# Patient Record
Sex: FEMALE | Race: WHITE | HISPANIC OR LATINO | Employment: UNEMPLOYED | URBAN - METROPOLITAN AREA
[De-identification: names, ages, dates, MRNs, and addresses within clinical notes are randomized per-mention and may not be internally consistent; named-entity substitution may affect disease eponyms.]

---

## 2023-10-20 ENCOUNTER — HOSPITAL ENCOUNTER (EMERGENCY)
Facility: HOSPITAL | Age: 33
Discharge: HOME | End: 2023-10-20
Attending: STUDENT IN AN ORGANIZED HEALTH CARE EDUCATION/TRAINING PROGRAM

## 2023-10-20 ENCOUNTER — APPOINTMENT (OUTPATIENT)
Dept: RADIOLOGY | Facility: HOSPITAL | Age: 33
End: 2023-10-20

## 2023-10-20 VITALS
BODY MASS INDEX: 27.66 KG/M2 | DIASTOLIC BLOOD PRESSURE: 71 MMHG | OXYGEN SATURATION: 99 % | HEART RATE: 85 BPM | SYSTOLIC BLOOD PRESSURE: 127 MMHG | RESPIRATION RATE: 16 BRPM | WEIGHT: 156.09 LBS | TEMPERATURE: 98.4 F | HEIGHT: 63 IN

## 2023-10-20 DIAGNOSIS — R10.9 LEFT FLANK PAIN: Primary | ICD-10-CM

## 2023-10-20 PROBLEM — G43.909 MIGRAINE HEADACHE: Status: ACTIVE | Noted: 2022-06-01

## 2023-10-20 LAB
ALBUMIN SERPL-MCNC: 4.2 G/DL (ref 3.5–5)
ALP BLD-CCNC: 110 U/L (ref 35–125)
ALT SERPL-CCNC: 25 U/L (ref 5–40)
ANION GAP SERPL CALC-SCNC: 12 MMOL/L
APPEARANCE UR: ABNORMAL
AST SERPL-CCNC: 39 U/L (ref 5–40)
BASOPHILS # BLD AUTO: 0.02 X10*3/UL (ref 0–0.1)
BASOPHILS NFR BLD AUTO: 0.5 %
BILIRUB SERPL-MCNC: 0.2 MG/DL (ref 0.1–1.2)
BILIRUB UR STRIP.AUTO-MCNC: NEGATIVE MG/DL
BUN SERPL-MCNC: 9 MG/DL (ref 8–25)
CALCIUM SERPL-MCNC: 8.5 MG/DL (ref 8.5–10.4)
CHLORIDE SERPL-SCNC: 103 MMOL/L (ref 97–107)
CO2 SERPL-SCNC: 23 MMOL/L (ref 24–31)
COLOR UR: ABNORMAL
CREAT SERPL-MCNC: 0.5 MG/DL (ref 0.4–1.6)
EOSINOPHIL # BLD AUTO: 0.1 X10*3/UL (ref 0–0.7)
EOSINOPHIL NFR BLD AUTO: 2.4 %
ERYTHROCYTE [DISTWIDTH] IN BLOOD BY AUTOMATED COUNT: 18.1 % (ref 11.5–14.5)
GFR SERPL CREATININE-BSD FRML MDRD: >90 ML/MIN/1.73M*2
GLUCOSE SERPL-MCNC: 106 MG/DL (ref 65–99)
GLUCOSE UR STRIP.AUTO-MCNC: NORMAL MG/DL
HCG UR QL IA.RAPID: NEGATIVE
HCT VFR BLD AUTO: 34.8 % (ref 36–46)
HGB BLD-MCNC: 10.6 G/DL (ref 12–16)
IMM GRANULOCYTES # BLD AUTO: 0 X10*3/UL (ref 0–0.7)
IMM GRANULOCYTES NFR BLD AUTO: 0 % (ref 0–0.9)
KETONES UR STRIP.AUTO-MCNC: NEGATIVE MG/DL
LEUKOCYTE ESTERASE UR QL STRIP.AUTO: NEGATIVE
LIPASE SERPL-CCNC: 20 U/L (ref 16–63)
LYMPHOCYTES # BLD AUTO: 2.05 X10*3/UL (ref 1.2–4.8)
LYMPHOCYTES NFR BLD AUTO: 49.5 %
MCH RBC QN AUTO: 23.2 PG (ref 26–34)
MCHC RBC AUTO-ENTMCNC: 30.5 G/DL (ref 32–36)
MCV RBC AUTO: 76 FL (ref 80–100)
MONOCYTES # BLD AUTO: 0.38 X10*3/UL (ref 0.1–1)
MONOCYTES NFR BLD AUTO: 9.2 %
NEUTROPHILS # BLD AUTO: 1.59 X10*3/UL (ref 1.2–7.7)
NEUTROPHILS NFR BLD AUTO: 38.4 %
NITRITE UR QL STRIP.AUTO: NEGATIVE
NRBC BLD-RTO: 0 /100 WBCS (ref 0–0)
PH UR STRIP.AUTO: 5.5 [PH]
PLATELET # BLD AUTO: 326 X10*3/UL (ref 150–450)
PMV BLD AUTO: 10.7 FL (ref 7.5–11.5)
POTASSIUM SERPL-SCNC: 3.8 MMOL/L (ref 3.4–5.1)
PROT SERPL-MCNC: 7.7 G/DL (ref 5.9–7.9)
PROT UR STRIP.AUTO-MCNC: NEGATIVE MG/DL
RBC # BLD AUTO: 4.56 X10*6/UL (ref 4–5.2)
RBC # UR STRIP.AUTO: NEGATIVE /UL
SODIUM SERPL-SCNC: 138 MMOL/L (ref 133–145)
SP GR UR STRIP.AUTO: 1.02
UROBILINOGEN UR STRIP.AUTO-MCNC: NORMAL MG/DL
WBC # BLD AUTO: 4.1 X10*3/UL (ref 4.4–11.3)

## 2023-10-20 PROCEDURE — 36415 COLL VENOUS BLD VENIPUNCTURE: CPT | Performed by: STUDENT IN AN ORGANIZED HEALTH CARE EDUCATION/TRAINING PROGRAM

## 2023-10-20 PROCEDURE — 81003 URINALYSIS AUTO W/O SCOPE: CPT | Performed by: STUDENT IN AN ORGANIZED HEALTH CARE EDUCATION/TRAINING PROGRAM

## 2023-10-20 PROCEDURE — 85025 COMPLETE CBC W/AUTO DIFF WBC: CPT | Performed by: STUDENT IN AN ORGANIZED HEALTH CARE EDUCATION/TRAINING PROGRAM

## 2023-10-20 PROCEDURE — 96375 TX/PRO/DX INJ NEW DRUG ADDON: CPT

## 2023-10-20 PROCEDURE — 99284 EMERGENCY DEPT VISIT MOD MDM: CPT | Mod: 25

## 2023-10-20 PROCEDURE — 80053 COMPREHEN METABOLIC PANEL: CPT | Performed by: STUDENT IN AN ORGANIZED HEALTH CARE EDUCATION/TRAINING PROGRAM

## 2023-10-20 PROCEDURE — 83690 ASSAY OF LIPASE: CPT | Performed by: STUDENT IN AN ORGANIZED HEALTH CARE EDUCATION/TRAINING PROGRAM

## 2023-10-20 PROCEDURE — 74176 CT ABD & PELVIS W/O CONTRAST: CPT | Mod: ME

## 2023-10-20 PROCEDURE — 99284 EMERGENCY DEPT VISIT MOD MDM: CPT | Performed by: STUDENT IN AN ORGANIZED HEALTH CARE EDUCATION/TRAINING PROGRAM

## 2023-10-20 PROCEDURE — 2500000004 HC RX 250 GENERAL PHARMACY W/ HCPCS (ALT 636 FOR OP/ED): Performed by: STUDENT IN AN ORGANIZED HEALTH CARE EDUCATION/TRAINING PROGRAM

## 2023-10-20 PROCEDURE — 81025 URINE PREGNANCY TEST: CPT | Performed by: STUDENT IN AN ORGANIZED HEALTH CARE EDUCATION/TRAINING PROGRAM

## 2023-10-20 PROCEDURE — 96374 THER/PROPH/DIAG INJ IV PUSH: CPT

## 2023-10-20 RX ORDER — ONDANSETRON HYDROCHLORIDE 2 MG/ML
4 INJECTION, SOLUTION INTRAVENOUS ONCE
Status: COMPLETED | OUTPATIENT
Start: 2023-10-20 | End: 2023-10-20

## 2023-10-20 RX ORDER — FERROUS SULFATE 325(65) MG
1 TABLET ORAL
COMMUNITY
Start: 2023-08-04

## 2023-10-20 RX ORDER — SUMATRIPTAN SUCCINATE 25 MG/1
25 TABLET ORAL
COMMUNITY
Start: 2023-08-04

## 2023-10-20 RX ORDER — OMEPRAZOLE 20 MG/1
20 CAPSULE, DELAYED RELEASE ORAL DAILY PRN
COMMUNITY
Start: 2023-08-04

## 2023-10-20 RX ORDER — NAPROXEN 500 MG/1
500 TABLET ORAL 2 TIMES DAILY PRN
Qty: 30 TABLET | Refills: 0 | Status: SHIPPED | OUTPATIENT
Start: 2023-10-20 | End: 2023-11-04

## 2023-10-20 RX ORDER — KETOROLAC TROMETHAMINE 30 MG/ML
15 INJECTION, SOLUTION INTRAMUSCULAR; INTRAVENOUS ONCE
Status: COMPLETED | OUTPATIENT
Start: 2023-10-20 | End: 2023-10-20

## 2023-10-20 RX ADMIN — ONDANSETRON 4 MG: 2 INJECTION INTRAMUSCULAR; INTRAVENOUS at 06:54

## 2023-10-20 RX ADMIN — KETOROLAC TROMETHAMINE 15 MG: 30 INJECTION, SOLUTION INTRAMUSCULAR at 07:49

## 2023-10-20 RX ADMIN — SODIUM CHLORIDE 1000 ML: 900 INJECTION, SOLUTION INTRAVENOUS at 06:53

## 2023-10-20 ASSESSMENT — PAIN DESCRIPTION - ORIENTATION: ORIENTATION: LEFT;LOWER

## 2023-10-20 ASSESSMENT — COLUMBIA-SUICIDE SEVERITY RATING SCALE - C-SSRS
6. HAVE YOU EVER DONE ANYTHING, STARTED TO DO ANYTHING, OR PREPARED TO DO ANYTHING TO END YOUR LIFE?: NO
2. HAVE YOU ACTUALLY HAD ANY THOUGHTS OF KILLING YOURSELF?: NO
1. IN THE PAST MONTH, HAVE YOU WISHED YOU WERE DEAD OR WISHED YOU COULD GO TO SLEEP AND NOT WAKE UP?: NO

## 2023-10-20 ASSESSMENT — PAIN DESCRIPTION - ONSET: ONSET: SUDDEN

## 2023-10-20 ASSESSMENT — PAIN SCALES - GENERAL: PAINLEVEL_OUTOF10: 8

## 2023-10-20 ASSESSMENT — PAIN - FUNCTIONAL ASSESSMENT: PAIN_FUNCTIONAL_ASSESSMENT: 0-10

## 2023-10-20 ASSESSMENT — PAIN DESCRIPTION - PAIN TYPE: TYPE: ACUTE PAIN

## 2023-10-20 ASSESSMENT — PAIN DESCRIPTION - DESCRIPTORS: DESCRIPTORS: ACHING;SHARP

## 2023-10-20 ASSESSMENT — PAIN DESCRIPTION - FREQUENCY: FREQUENCY: CONSTANT/CONTINUOUS

## 2023-10-20 ASSESSMENT — PAIN DESCRIPTION - LOCATION: LOCATION: ABDOMEN

## 2023-10-20 NOTE — ED PROVIDER NOTES
HPI   Chief Complaint   Patient presents with    Abdominal Pain     Patient stated that around 1900 she started having LLQ pain. She stated that she his also having N/V and burning with urination.       Patient is a 33-year-old female who presents emergency department for evaluation of left-sided flank and lower abdominal pain.  Patient states pain started yesterday.  She describes as an aching sensation.  Nothing seems to make it better or worse.  Has been constant and moderate in severity.  She does admit to nausea with several episodes of nonbloody, nonbilious emesis.  She denies fever, chills.  She does admit to some increased urinary frequency and dysuria.  She denies any vaginal bleeding or discharge, change in bowel habits, chest pain, shortness of breath.  Denies any prior history of kidney stones.      History provided by:  Patient                      [place] Coma Scale Score: 15                  Patient History   Past Medical History:   Diagnosis Date    Migraine, unspecified, not intractable, without status migrainosus     Migraines     History reviewed. No pertinent surgical history.  No family history on file.  Social History     Tobacco Use    Smoking status: Never    Smokeless tobacco: Never   Substance Use Topics    Alcohol use: Not on file    Drug use: Not on file       Physical Exam   ED Triage Vitals   Temp Pulse Resp BP   -- -- -- --      SpO2 Temp src Heart Rate Source Patient Position   -- -- -- --      BP Location FiO2 (%)     -- --       Physical Exam  Vitals and nursing note reviewed.   Constitutional:       Appearance: She is well-developed.   HENT:      Head: Normocephalic and atraumatic.      Mouth/Throat:      Mouth: Mucous membranes are moist.   Eyes:      Extraocular Movements: Extraocular movements intact.      Pupils: Pupils are equal, round, and reactive to light.   Cardiovascular:      Rate and Rhythm: Normal rate and regular rhythm.   Pulmonary:      Effort: Pulmonary effort is  normal. No respiratory distress.      Breath sounds: Normal breath sounds. No stridor. No wheezing or rhonchi.   Abdominal:      General: Abdomen is flat.      Palpations: Abdomen is soft.      Tenderness: There is abdominal tenderness (Mild left lower quadrant tenderness palpation without rigidity or guarding) in the left lower quadrant. There is left CVA tenderness. There is no right CVA tenderness, guarding or rebound.   Skin:     General: Skin is warm and dry.   Neurological:      General: No focal deficit present.      Mental Status: She is alert.       Recent Results (from the past 24 hour(s))   Comprehensive Metabolic Panel    Collection Time: 10/20/23  6:50 AM   Result Value Ref Range    Glucose 106 (H) 65 - 99 mg/dL    Sodium 138 133 - 145 mmol/L    Potassium 3.8 3.4 - 5.1 mmol/L    Chloride 103 97 - 107 mmol/L    Bicarbonate 23 (L) 24 - 31 mmol/L    Urea Nitrogen 9 8 - 25 mg/dL    Creatinine 0.50 0.40 - 1.60 mg/dL    eGFR >90 >60 mL/min/1.73m*2    Calcium 8.5 8.5 - 10.4 mg/dL    Albumin 4.2 3.5 - 5.0 g/dL    Alkaline Phosphatase 110 35 - 125 U/L    Total Protein 7.7 5.9 - 7.9 g/dL    AST 39 5 - 40 U/L    Bilirubin, Total 0.2 0.1 - 1.2 mg/dL    ALT 25 5 - 40 U/L    Anion Gap 12 <=19 mmol/L   Lipase    Collection Time: 10/20/23  6:50 AM   Result Value Ref Range    Lipase 20 16 - 63 U/L   CBC and Auto Differential    Collection Time: 10/20/23  6:50 AM   Result Value Ref Range    WBC 4.1 (L) 4.4 - 11.3 x10*3/uL    nRBC 0.0 0.0 - 0.0 /100 WBCs    RBC 4.56 4.00 - 5.20 x10*6/uL    Hemoglobin 10.6 (L) 12.0 - 16.0 g/dL    Hematocrit 34.8 (L) 36.0 - 46.0 %    MCV 76 (L) 80 - 100 fL    MCH 23.2 (L) 26.0 - 34.0 pg    MCHC 30.5 (L) 32.0 - 36.0 g/dL    RDW 18.1 (H) 11.5 - 14.5 %    Platelets 326 150 - 450 x10*3/uL    MPV 10.7 7.5 - 11.5 fL    Neutrophils % 38.4 40.0 - 80.0 %    Immature Granulocytes %, Automated 0.0 0.0 - 0.9 %    Lymphocytes % 49.5 13.0 - 44.0 %    Monocytes % 9.2 2.0 - 10.0 %    Eosinophils % 2.4 0.0  - 6.0 %    Basophils % 0.5 0.0 - 2.0 %    Neutrophils Absolute 1.59 1.20 - 7.70 x10*3/uL    Immature Granulocytes Absolute, Automated 0.00 0.00 - 0.70 x10*3/uL    Lymphocytes Absolute 2.05 1.20 - 4.80 x10*3/uL    Monocytes Absolute 0.38 0.10 - 1.00 x10*3/uL    Eosinophils Absolute 0.10 0.00 - 0.70 x10*3/uL    Basophils Absolute 0.02 0.00 - 0.10 x10*3/uL   hCG, Urine, Qualitative    Collection Time: 10/20/23  6:51 AM   Result Value Ref Range    HCG, Urine NEGATIVE NEGATIVE   Urinalysis with Reflex Microscopic    Collection Time: 10/20/23  6:51 AM   Result Value Ref Range    Color, Urine Light-Yellow Light-Yellow, Yellow, Dark-Yellow    Appearance, Urine Turbid (N) Clear    Specific Gravity, Urine 1.019 1.005 - 1.035    pH, Urine 5.5 5.0, 5.5, 6.0, 6.5, 7.0, 7.5, 8.0    Protein, Urine NEGATIVE NEGATIVE, 10 (TRACE), 20 (TRACE) mg/dL    Glucose, Urine Normal Normal mg/dL    Blood, Urine NEGATIVE NEGATIVE    Ketones, Urine NEGATIVE NEGATIVE mg/dL    Bilirubin, Urine NEGATIVE NEGATIVE    Urobilinogen, Urine Normal Normal mg/dL    Nitrite, Urine NEGATIVE NEGATIVE    Leukocyte Esterase, Urine NEGATIVE NEGATIVE         ED Course & MDM   Diagnoses as of 10/20/23 0856   Left flank pain       Medical Decision Making  Patient is a 33-year-old female that presents emergency department for evaluation of left lower quadrant and left flank tenderness palpation.  Patient uncomfortable appearing but no obvious distress on exam.  Vital signs are stable on arrival.  She is awake, alert and oriented.  Given her left flank tenderness palpation there is concern for possible kidney stone versus urinary tract infection versus pyelonephritis and a CT scan of the abdomen pelvis was ordered.  Urinalysis along with urine pregnancy test were also ordered.  Blood work ordered including CBC, CMP, lipase.  Patient will initially be given IV saline, IV Zofran.  Blood work is unremarkable including slight leukopenia with a white count of 4.1 however  patient has normal electrolytes normal kidney function.  Urinalysis shows no evidence of leukocyte esterase or nitrates as well as no evidence of blood and I have low suspicion infection at this time.  CT scan of the abdomen pelvis shows no acute process including no evidence of obstruction, no evidence of kidney stone.  Patient feeling better on reevaluation after she was given IV Toradol, IV Zofran and IV saline bolus.  Patient be discharged with p.o. Naprosyn.  She was also given referral for outpatient follow-up with primary care physician.  Return precautions were discussed with patient.        Procedure  Procedures     Jayy Chakraborty DO  10/20/23 0857

## 2023-10-20 NOTE — DISCHARGE INSTRUCTIONS
You can take Naprosyn twice a day as needed for pain.  If symptoms worsen or change she can return at any time for further evaluation and treatment.

## 2024-07-22 ENCOUNTER — HOSPITAL ENCOUNTER (EMERGENCY)
Facility: HOSPITAL | Age: 34
Discharge: HOME | End: 2024-07-22

## 2024-07-22 VITALS
WEIGHT: 153 LBS | DIASTOLIC BLOOD PRESSURE: 77 MMHG | TEMPERATURE: 97.5 F | SYSTOLIC BLOOD PRESSURE: 124 MMHG | RESPIRATION RATE: 16 BRPM | HEART RATE: 76 BPM | BODY MASS INDEX: 27.11 KG/M2 | HEIGHT: 63 IN | OXYGEN SATURATION: 96 %

## 2024-07-22 DIAGNOSIS — R51.9 ACUTE NONINTRACTABLE HEADACHE, UNSPECIFIED HEADACHE TYPE: Primary | ICD-10-CM

## 2024-07-22 PROCEDURE — 99284 EMERGENCY DEPT VISIT MOD MDM: CPT | Performed by: PHYSICIAN ASSISTANT

## 2024-07-22 PROCEDURE — 2500000001 HC RX 250 WO HCPCS SELF ADMINISTERED DRUGS (ALT 637 FOR MEDICARE OP): Performed by: PHYSICIAN ASSISTANT

## 2024-07-22 PROCEDURE — 99282 EMERGENCY DEPT VISIT SF MDM: CPT

## 2024-07-22 RX ORDER — NAPROXEN 500 MG/1
500 TABLET ORAL 2 TIMES DAILY
Qty: 20 TABLET | Refills: 0 | Status: SHIPPED | OUTPATIENT
Start: 2024-07-22

## 2024-07-22 RX ORDER — NAPROXEN 500 MG/1
500 TABLET ORAL ONCE
Status: COMPLETED | OUTPATIENT
Start: 2024-07-22 | End: 2024-07-22

## 2024-07-22 RX ORDER — ACETAMINOPHEN 325 MG/1
650 TABLET ORAL EVERY 6 HOURS PRN
Qty: 30 TABLET | Refills: 0 | Status: SHIPPED | OUTPATIENT
Start: 2024-07-22

## 2024-07-22 RX ORDER — ACETAMINOPHEN 325 MG/1
975 TABLET ORAL ONCE
Status: COMPLETED | OUTPATIENT
Start: 2024-07-22 | End: 2024-07-22

## 2024-07-22 ASSESSMENT — PAIN SCALES - GENERAL
PAINLEVEL_OUTOF10: 10 - WORST POSSIBLE PAIN
PAINLEVEL_OUTOF10: 10 - WORST POSSIBLE PAIN

## 2024-07-22 ASSESSMENT — PAIN - FUNCTIONAL ASSESSMENT: PAIN_FUNCTIONAL_ASSESSMENT: 0-10

## 2024-07-22 ASSESSMENT — PAIN DESCRIPTION - LOCATION
LOCATION: HEAD
LOCATION: HEAD

## 2024-07-22 ASSESSMENT — PAIN DESCRIPTION - PAIN TYPE: TYPE: ACUTE PAIN

## 2024-07-22 NOTE — DISCHARGE INSTRUCTIONS
Take the naproxen in addition to the Tylenol for the headaches.  It is important to stay well-hydrated.  Get plenty of rest.  If your symptoms persist follow-up with your primary care doctor

## 2024-07-22 NOTE — ED PROVIDER NOTES
HPI:  33-year-old female otherwise healthy presents complaining headache.  States been going on for about 3 days.  Denies any known inciting incident.  States she occasionally gets headaches this feels similar to prior.  Denies any any acute sudden onset.  Denies any thunderclap nature.  States this is not the worst headache of her life.  States it was insidious in onset.  Has tried Tylenol without adequate relief.  Has not tried anything else like NSAIDs.  Occasionally has felt lightheaded.  Denies any other symptoms including nausea, vomiting, blurry vision, photophobia, fever, chills, night sweats or rigors, cough, chest pain, shortness of breath.    Physical Exam:   GEN: Vitals noted. NAD  EYES:  EOMs grossly intact, anicteric sclera  TREVON: Mucosa moist.  NECK: Supple.  CARD: RRR  PULMONARY: Moving air well. Clear all lung fields.  ABDOMEN: Soft, no guarding, no rigidity. Nontender. NABS  EXTREMITIES: Full ROM, no pitting edema,   SKIN: Intact, warm and dry  NEURO: Alert and oriented x 3, speech is clear, no obvious deficits noted.  CN II through XII intact, no nystagmus, negative Romberg, nontender gait, intact sensation strength all 4 extremities, good finger-nose-finger, negative Kernig's and Brudzinski's sign      ----------------------------------------------------------------------------------------------------------------------------    MDM:  33-year-old female presenting for insidious onset headache x 3 days.  On exam she is well-appearing ambulating comfortably.  Vital signs stable.  Neurologically intact no focal findings including negative meningismal signs.  No indication for advanced imaging as there is low suspicion for acute intracranial pathology causing her headache.  Highest likelihood is for simple migraine headache.  She declined IV or IM remedies, is given naproxen and Tylenol.  She is discharged with instructions to follow-up with PCP.  Return precautions reviewed.    Diagnoses as of  07/22/24 0856   Acute nonintractable headache, unspecified headache type     No orders to display     Labs Reviewed - No data to display    ----------------------------------------------------------------------------------------------------------------------------    This note was dictated using a speech recognition program.  While an attempt was made at proof reading to minimize errors, minor errors in transcription may be present call for questions.     Josh Zavala PA-C  07/22/24 0858

## 2024-07-22 NOTE — Clinical Note
Lakeshia Paez was seen and treated in our emergency department on 7/22/2024.  She may return to work on 07/23/2024.       If you have any questions or concerns, please don't hesitate to call.      Josh Zavala PA-C

## 2025-01-18 ENCOUNTER — APPOINTMENT (OUTPATIENT)
Dept: RADIOLOGY | Facility: HOSPITAL | Age: 35
End: 2025-01-18

## 2025-01-18 ENCOUNTER — HOSPITAL ENCOUNTER (EMERGENCY)
Facility: HOSPITAL | Age: 35
Discharge: HOME | End: 2025-01-18
Attending: STUDENT IN AN ORGANIZED HEALTH CARE EDUCATION/TRAINING PROGRAM

## 2025-01-18 VITALS
OXYGEN SATURATION: 98 % | DIASTOLIC BLOOD PRESSURE: 83 MMHG | WEIGHT: 155.2 LBS | HEIGHT: 63 IN | BODY MASS INDEX: 27.5 KG/M2 | HEART RATE: 97 BPM | SYSTOLIC BLOOD PRESSURE: 116 MMHG | TEMPERATURE: 97.9 F | RESPIRATION RATE: 16 BRPM
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
## 2025-01-18 DIAGNOSIS — S52.502A CLOSED FRACTURE OF DISTAL END OF LEFT RADIUS, UNSPECIFIED FRACTURE MORPHOLOGY, INITIAL ENCOUNTER: Primary | ICD-10-CM

## 2025-01-18 PROCEDURE — 2500000004 HC RX 250 GENERAL PHARMACY W/ HCPCS (ALT 636 FOR OP/ED): Performed by: STUDENT IN AN ORGANIZED HEALTH CARE EDUCATION/TRAINING PROGRAM

## 2025-01-18 PROCEDURE — 73110 X-RAY EXAM OF WRIST: CPT | Mod: LEFT SIDE | Performed by: RADIOLOGY

## 2025-01-18 PROCEDURE — 73110 X-RAY EXAM OF WRIST: CPT | Mod: LT

## 2025-01-18 PROCEDURE — 2500000001 HC RX 250 WO HCPCS SELF ADMINISTERED DRUGS (ALT 637 FOR MEDICARE OP): Performed by: STUDENT IN AN ORGANIZED HEALTH CARE EDUCATION/TRAINING PROGRAM

## 2025-01-18 PROCEDURE — 96372 THER/PROPH/DIAG INJ SC/IM: CPT | Performed by: STUDENT IN AN ORGANIZED HEALTH CARE EDUCATION/TRAINING PROGRAM

## 2025-01-18 PROCEDURE — 29125 APPL SHORT ARM SPLINT STATIC: CPT | Mod: LT | Performed by: STUDENT IN AN ORGANIZED HEALTH CARE EDUCATION/TRAINING PROGRAM

## 2025-01-18 PROCEDURE — 99284 EMERGENCY DEPT VISIT MOD MDM: CPT | Performed by: STUDENT IN AN ORGANIZED HEALTH CARE EDUCATION/TRAINING PROGRAM

## 2025-01-18 RX ORDER — ACETAMINOPHEN 325 MG/1
975 TABLET ORAL ONCE
Status: COMPLETED | OUTPATIENT
Start: 2025-01-18 | End: 2025-01-18

## 2025-01-18 RX ORDER — KETOROLAC TROMETHAMINE 30 MG/ML
15 INJECTION, SOLUTION INTRAMUSCULAR; INTRAVENOUS ONCE
Status: COMPLETED | OUTPATIENT
Start: 2025-01-18 | End: 2025-01-18

## 2025-01-18 RX ORDER — OXYCODONE AND ACETAMINOPHEN 5; 325 MG/1; MG/1
1 TABLET ORAL EVERY 6 HOURS PRN
Qty: 12 TABLET | Refills: 0 | Status: SHIPPED | OUTPATIENT
Start: 2025-01-18 | End: 2025-01-21

## 2025-01-18 RX ORDER — NAPROXEN 500 MG/1
500 TABLET ORAL
Qty: 30 TABLET | Refills: 0 | Status: SHIPPED | OUTPATIENT
Start: 2025-01-18 | End: 2025-02-02

## 2025-01-18 RX ADMIN — KETOROLAC TROMETHAMINE 15 MG: 30 INJECTION, SOLUTION INTRAMUSCULAR at 07:41

## 2025-01-18 RX ADMIN — ACETAMINOPHEN 975 MG: 325 TABLET ORAL at 07:42

## 2025-01-18 ASSESSMENT — PAIN SCALES - GENERAL
PAINLEVEL_OUTOF10: 9
PAINLEVEL_OUTOF10: 3

## 2025-01-18 ASSESSMENT — PAIN - FUNCTIONAL ASSESSMENT
PAIN_FUNCTIONAL_ASSESSMENT: 0-10
PAIN_FUNCTIONAL_ASSESSMENT: 0-10

## 2025-01-18 NOTE — DISCHARGE INSTRUCTIONS
Call the orthopedic surgeon using the contact information provided to arrange a follow up appointment in one week for reevaluation of your injury. Follow splint care as provided, keep the splint clean and dry as much as possible. If you need to bathe or shower you can cover the splint with a plastic bag.     Return to the ED for any new or concerning symptoms including worsening of pain, loss of sensation or paralysis of the extremity, reinjury, or splint malfunction. You may have worsening pain if you have significant swelling due to your injury while the splint is in place. You can keep it elevated above the level of the heart to prevent or reduce swelling. If you have persistent swelling and severe worsening of pain that does not improve with elevation, you may need to return to the ED to have the splint removed and a new one placed.

## 2025-01-18 NOTE — ED PROVIDER NOTES
HPI   Chief Complaint   Patient presents with    Wrist Injury     Pt slipped and fell last night injuring her left wrist.  Has limited mobility.  Sensation and pulse intact.        This is a 34-year-old female presenting to the ED for evaluation of left distal injury.  She has a history of migraine headaches.  She states that she was in the kitchen and slipped on something on the floor, fell with her arm outstretched landing directly on the palm of the hand.  She complains of pain at the wrist/distal forearm but denies any proximal forearm, elbow, shoulder or upper arm pain.  She did not hit her head or lose consciousness.  She denies any other injuries from the fall.  She rates her pain of 9 or 10 out of 10 in severity, has not taken any medicine for the pain before coming to the ED.  She did drive here.  She states that she does not believe there is any chance she is currently pregnant.      History provided by:  Patient   used: No            Patient History   Past Medical History:   Diagnosis Date    Migraine, unspecified, not intractable, without status migrainosus     Migraines     History reviewed. No pertinent surgical history.  No family history on file.  Social History     Tobacco Use    Smoking status: Never    Smokeless tobacco: Never   Substance Use Topics    Alcohol use: Not on file    Drug use: Not on file       Physical Exam   ED Triage Vitals [01/18/25 0724]   Temperature Heart Rate Respirations BP   36.6 °C (97.9 °F) 97 16 116/83      Pulse Ox Temp src Heart Rate Source Patient Position   98 % -- -- --      BP Location FiO2 (%)     -- --       Physical Exam  General: well developed, well nourished 34-year-old female who is awake and alert, in no apparent distress  HENT: normocephalic, atraumatic.   CV: regular rate and rhythm, no murmur, no gallops, or rubs. radial pulses +2/4 bilaterally, brisk capillary refill intact to the fingers distal to the injury of the left wrist  Resp:  clear to ascultation bilaterally, no wheezes, rales, or rhonchi  MSK: Patient moves all fingers, no significant tenderness over the hand although there is anatomic snuffbox tenderness at the left wrist as well as tenderness to palpation of the distal radius and ulna.  No tenderness more proximally including at the elbow or upper arm.  No obvious deformity at the wrist.  No skin injury present.  Neuro: Sensation fully intact.  Psych: appropriate mood and affect, cooperative with exam  Skin: warm, dry, without evidence of rash or abrasions    ED Course & MDM   ED Course as of 01/18/25 0823   Sat Jan 18, 2025   [id number] I reviewed the patient's wrist x-ray, she has what appears to be nondisplaced distal radius fracture. [NT]      ED Course User Index  [NT] Brandon Boggs DO         Diagnoses as of 01/18/25 0823   Fall, initial encounter   Closed fracture of distal end of left radius, unspecified fracture morphology, initial encounter                 No data recorded     [place] Coma Scale Score: 15 (01/18/25 0726 : Lorrie Dumont RN)                           Medical Decision Making  Patient presents the ED for evaluation of injuries after mechanical fall.  See HPI for details of how the patient fell.  The patient had no shortness of breath, dizziness, lightheadedness, chest pain or palpitations, no symptoms suggestive of arrhythmia, acute coronary syndrome, any other acute medical cause for their fall today.  No indication for extensive medical workup today.  Imaging ordered to assess for evidence of acute injury from the fall.    Physical exam shows tenderness and mild swelling to the left wrist, she does have tenderness over the anatomic snuffbox as well.  No significant tenderness of the bones of the hand, or any identifiable injury to the fingers.  No tenderness or swelling to the proximal forearm, elbow, upper arm or shoulder.  No other injuries identified on exam.    Patient was treated with Tylenol and IM  Toradol, before giving Toradol I confirmed with the patient that she does not believe she could be pregnant currently.    I personally reviewed the patient's x-rays showing A nondisplaced distal radius fracture.  She was placed in a sugar-tong splint, see separate splint application note for additional details.  I reassessed the patient after splint placement, distal perfusion, sensation, motor function remains intact and unchanged.  She was referred to orthopedic surgery for reassessment and cast placement.  Work note was written for the patient.  She was discharged in improved condition.  Pain medication sent to the patient's pharmacy as well.        Procedure  Splint Application    Performed by: Brandon Boggs DO  Authorized by: Brandon Boggs DO    Consent:     Consent obtained:  Verbal    Consent given by:  Patient    Risks, benefits, and alternatives were discussed: yes      Risks discussed:  Pain, numbness, swelling and discoloration    Alternatives discussed:  Delayed treatment and referral  Universal protocol:     Procedure explained and questions answered to patient or proxy's satisfaction: yes      Relevant documents present and verified: yes      Test results available: yes      Imaging studies available: yes      Required blood products, implants, devices, and special equipment available: yes      Site/side marked: yes      Immediately prior to procedure a time out was called: yes      Patient identity confirmed:  Verbally with patient  Pre-procedure details:     Distal neurologic exam:  Normal    Distal perfusion: distal pulses strong and brisk capillary refill    Procedure details:     Location:  Wrist    Wrist location:  L wrist    Splint type:  Sugar tong    Supplies:  Fiberglass    Attestation: Splint applied and adjusted personally by me    Post-procedure details:     Distal neurologic exam:  Normal    Distal perfusion: brisk capillary refill and unchanged      Procedure completion:   Tolerated well, no immediate complications    Post-procedure imaging: not applicable         Brandon Boggs,   01/18/25 0811

## 2025-01-18 NOTE — Clinical Note
Lakeshia Paez was seen and treated in our emergency department on 1/18/2025.  She may return to work on 01/27/2025.  She may not be able to use the left hand well as long as she has a splint in place.  She can return to work after being cleared by orthopedic surgery to return to her usual duties, in the meantime she may need to perform light duty or anything she can do that does not require the use of her left hand due to her injury.     If you have any questions or concerns, please don't hesitate to call.      Brandon Boggs, DO
Lakeshia Paez was seen and treated in our emergency department on 1/18/2025.  She may return to work on 01/27/2025.  She may not be able to use the left hand well as long as she has a splint in place.  She can return to work after being cleared by orthopedic surgery to return to her usual duties, in the meantime she may need to perform light duty or anything she can do that does not require the use of her left hand due to her injury.     If you have any questions or concerns, please don't hesitate to call.      Brandon Boggs, DO
(1) Outpatient Area

## 2025-06-16 ENCOUNTER — HOSPITAL ENCOUNTER (EMERGENCY)
Facility: HOSPITAL | Age: 35
Discharge: HOME | End: 2025-06-16

## 2025-06-16 VITALS
OXYGEN SATURATION: 99 % | HEIGHT: 63 IN | HEART RATE: 94 BPM | SYSTOLIC BLOOD PRESSURE: 117 MMHG | WEIGHT: 158.07 LBS | DIASTOLIC BLOOD PRESSURE: 79 MMHG | BODY MASS INDEX: 28.01 KG/M2 | TEMPERATURE: 98.1 F | RESPIRATION RATE: 16 BRPM

## 2025-06-16 DIAGNOSIS — R51.9 ACUTE NONINTRACTABLE HEADACHE, UNSPECIFIED HEADACHE TYPE: Primary | ICD-10-CM

## 2025-06-16 PROCEDURE — 96375 TX/PRO/DX INJ NEW DRUG ADDON: CPT

## 2025-06-16 PROCEDURE — 99284 EMERGENCY DEPT VISIT MOD MDM: CPT

## 2025-06-16 PROCEDURE — 96361 HYDRATE IV INFUSION ADD-ON: CPT

## 2025-06-16 PROCEDURE — 2500000004 HC RX 250 GENERAL PHARMACY W/ HCPCS (ALT 636 FOR OP/ED): Mod: JZ

## 2025-06-16 PROCEDURE — 96374 THER/PROPH/DIAG INJ IV PUSH: CPT

## 2025-06-16 RX ORDER — DIPHENHYDRAMINE HYDROCHLORIDE 50 MG/ML
25 INJECTION, SOLUTION INTRAMUSCULAR; INTRAVENOUS ONCE
Status: COMPLETED | OUTPATIENT
Start: 2025-06-16 | End: 2025-06-16

## 2025-06-16 RX ORDER — KETOROLAC TROMETHAMINE 30 MG/ML
30 INJECTION, SOLUTION INTRAMUSCULAR; INTRAVENOUS ONCE
Status: COMPLETED | OUTPATIENT
Start: 2025-06-16 | End: 2025-06-16

## 2025-06-16 RX ORDER — PROCHLORPERAZINE EDISYLATE 5 MG/ML
10 INJECTION INTRAMUSCULAR; INTRAVENOUS ONCE
Status: COMPLETED | OUTPATIENT
Start: 2025-06-16 | End: 2025-06-16

## 2025-06-16 RX ADMIN — KETOROLAC TROMETHAMINE 30 MG: 30 INJECTION, SOLUTION INTRAMUSCULAR at 22:06

## 2025-06-16 RX ADMIN — DIPHENHYDRAMINE HYDROCHLORIDE 25 MG: 50 INJECTION, SOLUTION INTRAMUSCULAR; INTRAVENOUS at 22:06

## 2025-06-16 RX ADMIN — SODIUM CHLORIDE 500 ML: 900 INJECTION, SOLUTION INTRAVENOUS at 22:06

## 2025-06-16 RX ADMIN — PROCHLORPERAZINE EDISYLATE 10 MG: 5 INJECTION INTRAMUSCULAR; INTRAVENOUS at 22:06

## 2025-06-16 ASSESSMENT — PAIN DESCRIPTION - ONSET: ONSET: ONGOING

## 2025-06-16 ASSESSMENT — PAIN DESCRIPTION - FREQUENCY: FREQUENCY: CONSTANT/CONTINUOUS

## 2025-06-16 ASSESSMENT — PAIN - FUNCTIONAL ASSESSMENT: PAIN_FUNCTIONAL_ASSESSMENT: 0-10

## 2025-06-16 ASSESSMENT — PAIN DESCRIPTION - PROGRESSION: CLINICAL_PROGRESSION: NOT CHANGED

## 2025-06-16 ASSESSMENT — PAIN SCALES - GENERAL: PAINLEVEL_OUTOF10: 7

## 2025-06-16 ASSESSMENT — PAIN DESCRIPTION - LOCATION: LOCATION: HEAD

## 2025-06-16 ASSESSMENT — PAIN DESCRIPTION - PAIN TYPE: TYPE: ACUTE PAIN

## 2025-06-16 NOTE — Clinical Note
Lakeshia Paez was seen and treated in our emergency department on 6/16/2025.  She may return to work on 06/18/2025.       If you have any questions or concerns, please don't hesitate to call.      Giana Casillas RN

## 2025-06-17 NOTE — ED PROVIDER NOTES
HPI   Chief Complaint   Patient presents with    Headache     Pt has had a headache for the last 3 days. Pt has been treating with tylenol.       Patient is a 34-year-old female presenting to the emergency department for evaluation of migraine headache.  Patient states she has a history of migraines however is out of her migraine medication.  She states that she has had a migraine across her forehead for the past 3 days.  She reports taking Tylenol with minimal relief in symptoms.  She denies any nausea, vomiting, changes in vision, fevers, chills.  She denies any numbness or tingling.  She states this is not the worst headache of her life.  She denies any head injury or trauma.              Patient History   Medical History[1]  Surgical History[2]  Family History[3]  Social History[4]    Physical Exam   ED Triage Vitals [06/16/25 2127]   Temperature Heart Rate Respirations BP   36.7 °C (98.1 °F) 94 16 117/79      Pulse Ox Temp Source Heart Rate Source Patient Position   99 % Temporal Monitor Sitting      BP Location FiO2 (%)     Right arm --       Physical Exam  Vitals and nursing note reviewed.   Constitutional:       General: She is not in acute distress.     Appearance: She is well-developed. She is not ill-appearing or toxic-appearing.   HENT:      Head: Normocephalic and atraumatic.      Mouth/Throat:      Mouth: Mucous membranes are moist.   Cardiovascular:      Rate and Rhythm: Normal rate and regular rhythm.      Heart sounds: Normal heart sounds.   Pulmonary:      Effort: Pulmonary effort is normal.      Breath sounds: Normal breath sounds.   Musculoskeletal:         General: Normal range of motion.      Cervical back: Normal range of motion.   Skin:     General: Skin is warm and dry.      Capillary Refill: Capillary refill takes less than 2 seconds.   Neurological:      Mental Status: She is alert and oriented to person, place, and time.      GCS: GCS eye subscore is 4. GCS verbal subscore is 5. GCS motor  "subscore is 6.      Cranial Nerves: No cranial nerve deficit (2-12).   Psychiatric:         Mood and Affect: Mood normal.         Behavior: Behavior normal.           ED Course & MDM   Diagnoses as of 06/16/25 2245   Acute nonintractable headache, unspecified headache type                 No data recorded                                 Medical Decision Making  **Disclaimer parts of this chart have been completed using voice recognition software. Please excuse any errors of transcription.     Evaluated this patient independently and my supervising physician was available for consultation.    HPI: Detailed above.    Exam: A medically appropriate exam performed, outlined above, given the known history and presentation.    History obtained from: Patient    EMERGENCY DEPARTMENT COURSE and DIFFERENTIAL DIAGNOSIS/MDM:  Patient is a 34-year-old female presenting to the emergency department for evaluation of migraine headache.  On physical exam vital signs stable and patient is in no acute distress.  Patient has no changes in vision.  No nausea or vomiting.  She has a history of migraine headaches therefore do not feel that imaging is necessary at this time.  Patient given headache cocktail with improvement in symptoms.  Low suspicion for any intracranial hemorrhage given that this is not the worst headache of her life, no changes in vision, no nausea, vomiting, no head injury.  Patient states she feels comfortable being discharged home.  Patient discharged in stable condition.  She will follow-up with primary care physician and neurology outpatient for further management of symptoms.  She will return to the emergency department with any new or worsening symptoms.      Vitals:    Vitals:    06/16/25 2127   BP: 117/79   BP Location: Right arm   Patient Position: Sitting   Pulse: 94   Resp: 16   Temp: 36.7 °C (98.1 °F)   TempSrc: Temporal   SpO2: 99%   Weight: 71.7 kg (158 lb 1.1 oz)   Height: 1.6 m (5' 3\")       Diagnoses as " of 06/16/25 2245   Acute nonintractable headache, unspecified headache type       History Limited by:    None    Independent history obtained from:    None    External records reviewed:    Outpatient Note primary care physician note from 6/13/2025    Diagnostics interpreted by me:    None    Discussions with other clinicians:    None    Chronic conditions impacting care:    None    Social determinants of health affecting care:    None    Diagnostic tests considered but not performed: CT of the head    ED Medications managed:    Medications   sodium chloride 0.9 % bolus 500 mL (500 mL intravenous New Bag 6/16/25 2206)   ketorolac (Toradol) injection 30 mg (30 mg intravenous Given 6/16/25 2206)   prochlorperazine (Compazine) injection 10 mg (10 mg intravenous Given 6/16/25 2206)   diphenhydrAMINE (BENADryl) injection 25 mg (25 mg intravenous Given 6/16/25 2206)       Prescription drugs considered:    None    Screenings:                Procedure  Procedures         [1]   Past Medical History:  Diagnosis Date    Migraine, unspecified, not intractable, without status migrainosus     Migraines   [2] No past surgical history on file.  [3] No family history on file.  [4]   Social History  Tobacco Use    Smoking status: Never    Smokeless tobacco: Never   Substance Use Topics    Alcohol use: Not on file    Drug use: Not on file        Christine العراقي PA-C  06/16/25 2249

## 2025-06-17 NOTE — DISCHARGE INSTRUCTIONS
Follow-up with primary care physician outpatient for further management of symptoms.  Return to the emergency department at anytime with any new or worsening symptoms.

## 2025-07-24 ENCOUNTER — APPOINTMENT (OUTPATIENT)
Dept: RADIOLOGY | Facility: HOSPITAL | Age: 35
End: 2025-07-24

## 2025-07-24 ENCOUNTER — HOSPITAL ENCOUNTER (EMERGENCY)
Facility: HOSPITAL | Age: 35
Discharge: HOME | End: 2025-07-24

## 2025-07-24 VITALS
OXYGEN SATURATION: 99 % | DIASTOLIC BLOOD PRESSURE: 76 MMHG | HEART RATE: 87 BPM | RESPIRATION RATE: 16 BRPM | BODY MASS INDEX: 27.46 KG/M2 | WEIGHT: 154.98 LBS | HEIGHT: 63 IN | TEMPERATURE: 98.8 F | SYSTOLIC BLOOD PRESSURE: 123 MMHG

## 2025-07-24 DIAGNOSIS — M79.18 MUSCULOSKELETAL PAIN: ICD-10-CM

## 2025-07-24 DIAGNOSIS — M54.50 ACUTE RIGHT-SIDED LOW BACK PAIN WITHOUT SCIATICA: Primary | ICD-10-CM

## 2025-07-24 LAB
ALBUMIN SERPL BCP-MCNC: 4.3 G/DL (ref 3.4–5)
ALP SERPL-CCNC: 84 U/L (ref 33–110)
ALT SERPL W P-5'-P-CCNC: 49 U/L (ref 7–45)
ANION GAP SERPL CALCULATED.3IONS-SCNC: 15 MMOL/L (ref 10–20)
APPEARANCE UR: CLEAR
AST SERPL W P-5'-P-CCNC: 41 U/L (ref 9–39)
BASOPHILS # BLD AUTO: 0.03 X10*3/UL (ref 0–0.1)
BASOPHILS NFR BLD AUTO: 0.7 %
BILIRUB SERPL-MCNC: 0.4 MG/DL (ref 0–1.2)
BILIRUB UR STRIP.AUTO-MCNC: NEGATIVE MG/DL
BUN SERPL-MCNC: 10 MG/DL (ref 6–23)
CALCIUM SERPL-MCNC: 8.8 MG/DL (ref 8.6–10.3)
CHLORIDE SERPL-SCNC: 105 MMOL/L (ref 98–107)
CO2 SERPL-SCNC: 22 MMOL/L (ref 21–32)
COLOR UR: ABNORMAL
CREAT SERPL-MCNC: 0.61 MG/DL (ref 0.5–1.05)
EGFRCR SERPLBLD CKD-EPI 2021: >90 ML/MIN/1.73M*2
EOSINOPHIL # BLD AUTO: 0.09 X10*3/UL (ref 0–0.7)
EOSINOPHIL NFR BLD AUTO: 2.1 %
ERYTHROCYTE [DISTWIDTH] IN BLOOD BY AUTOMATED COUNT: 18.9 % (ref 11.5–14.5)
GLUCOSE SERPL-MCNC: 86 MG/DL (ref 74–99)
GLUCOSE UR STRIP.AUTO-MCNC: NORMAL MG/DL
HCG UR QL IA.RAPID: NEGATIVE
HCT VFR BLD AUTO: 34.2 % (ref 36–46)
HGB BLD-MCNC: 10.3 G/DL (ref 12–16)
IMM GRANULOCYTES # BLD AUTO: 0 X10*3/UL (ref 0–0.7)
IMM GRANULOCYTES NFR BLD AUTO: 0 % (ref 0–0.9)
KETONES UR STRIP.AUTO-MCNC: NEGATIVE MG/DL
LEUKOCYTE ESTERASE UR QL STRIP.AUTO: ABNORMAL
LIPASE SERPL-CCNC: 16 U/L (ref 9–82)
LYMPHOCYTES # BLD AUTO: 1.86 X10*3/UL (ref 1.2–4.8)
LYMPHOCYTES NFR BLD AUTO: 42.5 %
MCH RBC QN AUTO: 22.4 PG (ref 26–34)
MCHC RBC AUTO-ENTMCNC: 30.1 G/DL (ref 32–36)
MCV RBC AUTO: 74 FL (ref 80–100)
MONOCYTES # BLD AUTO: 0.46 X10*3/UL (ref 0.1–1)
MONOCYTES NFR BLD AUTO: 10.5 %
MUCOUS THREADS #/AREA URNS AUTO: NORMAL /LPF
NEUTROPHILS # BLD AUTO: 1.94 X10*3/UL (ref 1.2–7.7)
NEUTROPHILS NFR BLD AUTO: 44.2 %
NITRITE UR QL STRIP.AUTO: NEGATIVE
NRBC BLD-RTO: 0 /100 WBCS (ref 0–0)
PH UR STRIP.AUTO: 7 [PH]
PLATELET # BLD AUTO: 317 X10*3/UL (ref 150–450)
POTASSIUM SERPL-SCNC: 4.2 MMOL/L (ref 3.5–5.3)
PROT SERPL-MCNC: 7.5 G/DL (ref 6.4–8.2)
PROT UR STRIP.AUTO-MCNC: NEGATIVE MG/DL
RBC # BLD AUTO: 4.6 X10*6/UL (ref 4–5.2)
RBC # UR STRIP.AUTO: NEGATIVE MG/DL
RBC #/AREA URNS AUTO: NORMAL /HPF
SODIUM SERPL-SCNC: 138 MMOL/L (ref 136–145)
SP GR UR STRIP.AUTO: 1.03
SQUAMOUS #/AREA URNS AUTO: NORMAL /HPF
UROBILINOGEN UR STRIP.AUTO-MCNC: NORMAL MG/DL
WBC # BLD AUTO: 4.4 X10*3/UL (ref 4.4–11.3)
WBC #/AREA URNS AUTO: NORMAL /HPF

## 2025-07-24 PROCEDURE — 96375 TX/PRO/DX INJ NEW DRUG ADDON: CPT

## 2025-07-24 PROCEDURE — 85025 COMPLETE CBC W/AUTO DIFF WBC: CPT

## 2025-07-24 PROCEDURE — 74177 CT ABD & PELVIS W/CONTRAST: CPT

## 2025-07-24 PROCEDURE — 83690 ASSAY OF LIPASE: CPT

## 2025-07-24 PROCEDURE — 99285 EMERGENCY DEPT VISIT HI MDM: CPT | Mod: 25

## 2025-07-24 PROCEDURE — 87077 CULTURE AEROBIC IDENTIFY: CPT | Mod: TRILAB

## 2025-07-24 PROCEDURE — 80053 COMPREHEN METABOLIC PANEL: CPT

## 2025-07-24 PROCEDURE — 81001 URINALYSIS AUTO W/SCOPE: CPT

## 2025-07-24 PROCEDURE — 2500000004 HC RX 250 GENERAL PHARMACY W/ HCPCS (ALT 636 FOR OP/ED): Mod: JZ

## 2025-07-24 PROCEDURE — 81025 URINE PREGNANCY TEST: CPT

## 2025-07-24 PROCEDURE — 74177 CT ABD & PELVIS W/CONTRAST: CPT | Performed by: RADIOLOGY

## 2025-07-24 PROCEDURE — 96374 THER/PROPH/DIAG INJ IV PUSH: CPT

## 2025-07-24 PROCEDURE — 2550000001 HC RX 255 CONTRASTS

## 2025-07-24 PROCEDURE — 2500000001 HC RX 250 WO HCPCS SELF ADMINISTERED DRUGS (ALT 637 FOR MEDICARE OP)

## 2025-07-24 PROCEDURE — 36415 COLL VENOUS BLD VENIPUNCTURE: CPT

## 2025-07-24 RX ORDER — LIDOCAINE 50 MG/G
1 PATCH TOPICAL DAILY
Qty: 21 PATCH | Refills: 0 | Status: SHIPPED | OUTPATIENT
Start: 2025-07-24

## 2025-07-24 RX ORDER — ONDANSETRON HYDROCHLORIDE 2 MG/ML
4 INJECTION, SOLUTION INTRAVENOUS ONCE
Status: COMPLETED | OUTPATIENT
Start: 2025-07-24 | End: 2025-07-24

## 2025-07-24 RX ORDER — IBUPROFEN 800 MG/1
800 TABLET, FILM COATED ORAL 3 TIMES DAILY
Qty: 21 TABLET | Refills: 0 | Status: SHIPPED | OUTPATIENT
Start: 2025-07-24 | End: 2025-07-31

## 2025-07-24 RX ORDER — METHYLPREDNISOLONE 4 MG/1
TABLET ORAL
Qty: 21 TABLET | Refills: 0 | Status: SHIPPED | OUTPATIENT
Start: 2025-07-24 | End: 2025-07-30

## 2025-07-24 RX ORDER — ACETAMINOPHEN 500 MG
1000 TABLET ORAL ONCE
Status: COMPLETED | OUTPATIENT
Start: 2025-07-24 | End: 2025-07-24

## 2025-07-24 RX ORDER — KETOROLAC TROMETHAMINE 15 MG/ML
15 INJECTION, SOLUTION INTRAMUSCULAR; INTRAVENOUS ONCE
Status: COMPLETED | OUTPATIENT
Start: 2025-07-24 | End: 2025-07-24

## 2025-07-24 RX ADMIN — KETOROLAC TROMETHAMINE 15 MG: 15 INJECTION, SOLUTION INTRAMUSCULAR; INTRAVENOUS at 14:34

## 2025-07-24 RX ADMIN — ONDANSETRON 4 MG: 2 INJECTION, SOLUTION INTRAMUSCULAR; INTRAVENOUS at 13:24

## 2025-07-24 RX ADMIN — IOHEXOL 75 ML: 350 INJECTION, SOLUTION INTRAVENOUS at 14:28

## 2025-07-24 RX ADMIN — ACETAMINOPHEN 1000 MG: 500 TABLET ORAL at 13:24

## 2025-07-24 ASSESSMENT — PAIN SCALES - GENERAL
PAINLEVEL_OUTOF10: 6
PAINLEVEL_OUTOF10: 7
PAINLEVEL_OUTOF10: 2
PAINLEVEL_OUTOF10: 3
PAINLEVEL_OUTOF10: 3

## 2025-07-24 ASSESSMENT — PAIN DESCRIPTION - ORIENTATION: ORIENTATION: RIGHT;LOWER

## 2025-07-24 ASSESSMENT — PAIN DESCRIPTION - DESCRIPTORS: DESCRIPTORS: PRESSURE

## 2025-07-24 ASSESSMENT — PAIN - FUNCTIONAL ASSESSMENT
PAIN_FUNCTIONAL_ASSESSMENT: 0-10

## 2025-07-24 ASSESSMENT — PAIN DESCRIPTION - PAIN TYPE: TYPE: ACUTE PAIN

## 2025-07-24 ASSESSMENT — PAIN DESCRIPTION - FREQUENCY: FREQUENCY: CONSTANT/CONTINUOUS

## 2025-07-24 ASSESSMENT — PAIN DESCRIPTION - LOCATION: LOCATION: BACK

## 2025-07-24 NOTE — DISCHARGE INSTRUCTIONS
Please follow-up closely with primary care provider in the following week.  Continue Tylenol and additionally given ibuprofen help with pain as well as steroids.  Continue gentle range of motion stretching.  Return for any new or worsening symptoms.

## 2025-07-24 NOTE — Clinical Note
Lakeshia Paez was seen and treated in our emergency department on 7/24/2025.  She may return to work on 07/27/2025.       If you have any questions or concerns, please don't hesitate to call.      Vidhya Winter PA-C

## 2025-07-24 NOTE — ED PROVIDER NOTES
HPI   Chief Complaint   Patient presents with    Flank Pain     Presents to ed with a c/c of right flank pain. Started 2 months ago. Pressure feeling. Denies recent falls or trauma. Also denies any painful or difficulty urinating. No known hx of  kidney stones.        Patient is a 34-year-old female presents emergency department for evaluation of right flank pain.  Patient states over the last 2 to 3 months she has been having intermittent pain in in her right low back that radiates into her right flank.  She describes it as a dull aching pain.  She states its relatively constant but waxes and wanes in severity.  She states it is worse with bending over.  She denies any direct injury or trauma.  She is never seen anybody for this.  She has been taking Tylenol to help with symptoms.  She denies any associated abdominal pain, nausea, vomiting, fevers, chills, lightheadedness, dizziness, urinary symptoms, changes in bowel movements.  She is not take any daily medications otherwise and feels relatively well otherwise.  She denies any bowel or bladder incontinence, saddle anesthesia, numbness tingling in the legs.      History provided by:  Patient   used: No            Patient History   Medical History[1]  Surgical History[2]  Family History[3]  Social History[4]    Physical Exam   ED Triage Vitals [07/24/25 1216]   Temperature Heart Rate Respirations BP   37.1 °C (98.8 °F) (!) 103 16 123/76      Pulse Ox Temp Source Heart Rate Source Patient Position   99 % Temporal Monitor Sitting      BP Location FiO2 (%)     Right arm --       Physical Exam  Constitutional:       Appearance: Normal appearance.     Cardiovascular:      Rate and Rhythm: Normal rate and regular rhythm.   Pulmonary:      Effort: Pulmonary effort is normal.      Breath sounds: Normal breath sounds.   Abdominal:      General: Abdomen is flat.      Palpations: Abdomen is soft.      Tenderness: There is no abdominal tenderness. There is  no right CVA tenderness or left CVA tenderness.     Musculoskeletal:         General: Normal range of motion.      Comments: Minimal tenderness palpation of her right low back with range of motion intact to right hip.  Right lower extremity with strong distal pulses.     Skin:     General: Skin is warm and dry.     Neurological:      General: No focal deficit present.      Mental Status: She is alert and oriented to person, place, and time.           ED Course & MDM   Diagnoses as of 07/25/25 1821   Acute right-sided low back pain without sciatica   Musculoskeletal pain                 No data recorded     [place] Coma Scale Score: 15 (07/24/25 1218 : Rashaad Viramontes, CHRISTINA)                           Medical Decision Making  Patient is a 34-year-old female presents emergency department for evaluation of right low back and flank pain.    Lab work done today included CMP, CBC, lipase, urinalysis, urine pregnancy.  Lab work with 25 leukocyte esterase with no white blood cells or bacteria.    Scans done today were interpreted/confirmed by radiologist and also interpreted by me which included CT abdomen/pelvis with contrast.  CT scan shows questionable nephrographic abnormalities in the right kidney with no hydroureteronephrosis with no stone with no abscess renal or otherwise in the abdomen or pelvis with no acute appendicitis diverticulitis or other colitis with no bowel obstruction perforation abscess or free fluid with lower tip of IUD at the external os with small liver lesions and chronic bilateral L4 pars defects with degenerative changes noted in lower lumbar spine.    Medications given at today's visit include IV Zofran, p.o. Tylenol, IV Toradol    I saw this patient independently.  Patient jeison stable and has significant proved of symptoms medications given emergency department.  Lab work without significant abnormality with kidney function within normal range.  Urinalysis has minimal leukocyte esterase with  no nitrates no bacteria no white cells and patient not having any active urinary symptoms at this time and therefore we will let urine go to culture.  CT imaging notes questionable nephrographic abnormalities in the right kidney with no hydroureteronephrosis with no other acute complicating factors.  Patient has no CVA tenderness on the right side and no stones noted on CT imaging.  Given no urinary symptoms with patient history do not feel this is likely pyelonephritis or urinary tract infection at this time.  Feel that patient symptoms are likely more musculoskeletal especially given that they are worse with movement and bending and not present all the time and have been ongoing intermittently over the last 2 months.  Lab work otherwise grossly unremarkable patient does have degenerative changes noted in the lower lumbar spine likely contributing to patient's musculoskeletal pain.  Patient was treated with short course of steroids and muscle laxer to help with symptoms and agreeable to plan and discharge at this time.  She will follow-up closely with primary care provider in the following week.    Prescriptions given on discharge: P.o. ibuprofen, topical lidocaine patch, Medrol Dosepak    ** Disclaimer:  Parts of this document were written utilizing a voice to text dictation software.  Note may contain minor transcription or typographical errors that were inadvertently transcribed by the computer software.        Procedure  Procedures       [1]   Past Medical History:  Diagnosis Date    Migraine, unspecified, not intractable, without status migrainosus     Migraines   [2] No past surgical history on file.  [3] No family history on file.  [4]   Social History  Tobacco Use    Smoking status: Never    Smokeless tobacco: Never   Substance Use Topics    Alcohol use: Not on file    Drug use: Not on file        Vidhya Winter PA-C  07/25/25 5324

## 2025-07-25 LAB
BACTERIA UR CULT: ABNORMAL
BACTERIA UR CULT: ABNORMAL

## 2025-07-26 ENCOUNTER — HOSPITAL ENCOUNTER (EMERGENCY)
Facility: HOSPITAL | Age: 35
Discharge: HOME | End: 2025-07-26
Attending: STUDENT IN AN ORGANIZED HEALTH CARE EDUCATION/TRAINING PROGRAM

## 2025-07-26 ENCOUNTER — RESULTS FOLLOW-UP (OUTPATIENT)
Dept: PHARMACY | Facility: HOSPITAL | Age: 35
End: 2025-07-26

## 2025-07-26 VITALS
DIASTOLIC BLOOD PRESSURE: 90 MMHG | RESPIRATION RATE: 18 BRPM | OXYGEN SATURATION: 96 % | HEIGHT: 63 IN | SYSTOLIC BLOOD PRESSURE: 113 MMHG | HEART RATE: 96 BPM | BODY MASS INDEX: 28.05 KG/M2 | TEMPERATURE: 98.2 F | WEIGHT: 158.29 LBS

## 2025-07-26 DIAGNOSIS — G89.29 CHRONIC RIGHT SI JOINT PAIN: Primary | ICD-10-CM

## 2025-07-26 DIAGNOSIS — M53.3 CHRONIC RIGHT SI JOINT PAIN: Primary | ICD-10-CM

## 2025-07-26 PROCEDURE — 99283 EMERGENCY DEPT VISIT LOW MDM: CPT | Performed by: STUDENT IN AN ORGANIZED HEALTH CARE EDUCATION/TRAINING PROGRAM

## 2025-07-26 RX ORDER — KETOROLAC TROMETHAMINE 10 MG/1
10 TABLET, FILM COATED ORAL EVERY 6 HOURS PRN
Qty: 20 TABLET | Refills: 0 | Status: SHIPPED | OUTPATIENT
Start: 2025-07-26 | End: 2025-07-31

## 2025-07-26 NOTE — ED PROVIDER NOTES
HPI   Chief Complaint   Patient presents with    Back Pain       This is a 34-year-old female presenting the ED for evaluation of right-sided lower back pain.  She was seen here 2 days ago for similar symptoms and had a full workup with CT imaging and lab work, was ultimately discharged home with prescriptions for medication for treatment of her pain.  She states that the back pain that she is experiencing has been ongoing for 2 months, but has been worse in severity over the past week or so.  She denies any fall or injury, any trauma preceding this.  She states that it does get aggravated at work with ambulation and with carrying boxes.  She states that she takes ibuprofen at home with improvement of the pain, her back was hurting a lot tonight so she took ibuprofen and came to the emergency department.  She states that her pain now is about a 3 out of 10 in severity after the ibuprofen tonight.  She denies any urinary incontinence, she denies any numbness of the legs or weakness in the legs associated with this.  She denies any trauma or injury preceding this exacerbation.      History provided by:  Patient   used: No            Patient History   Medical History[1]  Surgical History[2]  Family History[3]  Social History[4]    Physical Exam   ED Triage Vitals [07/26/25 0357]   Temperature Heart Rate Respirations BP   36.8 °C (98.2 °F) 96 18 113/90      Pulse Ox Temp Source Heart Rate Source Patient Position   96 % Temporal -- --      BP Location FiO2 (%)     -- --       Physical Exam  General: well developed, well nourished 34-year-old female who is awake and alert, in no apparent distress  CV: regular rate and rhythm, no murmur, no gallops, or rubs. dorsalis pedis pulses +2/4 bilaterally  Resp: clear to ascultation bilaterally, no wheezes, rales, or rhonchi  GI: abdomen soft, nontender without rigidity or guarding, no peritoneal signs, abdomen is nondistended, no masses palpated  MSK: No  midline spinal tenderness, tender to outpatient at the right SI joint and right paraspinal muscles.  No significant tenderness on the left side.  Strength +5/5 to upper and lower extremities bilaterally, no swelling of the extremities.  Straight leg raise test negative bilaterally.  Neuro: Sensation fully intact to the lower extremities bilaterally  Psych: appropriate mood and affect, cooperative with exam  Skin: warm, dry, without evidence of rash or abrasions    ED Course & MDM   Diagnoses as of 07/26/25 0433   Chronic right SI joint pain                 No data recorded     [place] Coma Scale Score: 15 (07/26/25 0400 : Yessica Barrett RN)                           Medical Decision Making  The patient is awake and alert, in no acute distress in the ED.  She is nontoxic-appearing.  Vital signs normal.  She has no midline spinal tenderness on exam, she does have paraspinal muscle tenderness and tenderness over the SI joint on the right side much more so than the left.  She has normal sensation on the lower extremities, normal strength.  Straight leg raise test is negative, I have low suspicion for an acute herniated disc.  She has no radicular pain concerning for nerve root compression or compression of the sciatic nerve peripherally.  We discussed that    She just had lab work and a CT scan of the abdomen and pelvis 2 days ago which was unremarkable.  Chronic changes in L4/L5 were identified which are unchanged compared with prior imaging, no evidence of fracture or bony lesion was identified at that time.  Given that she has had no other trauma or injury since then, and her pain is unchanged in location and character, I do not feel that repeat imaging is indicated today.    She understands his recommendations, she has no infectious signs or symptoms, no other complaints necessitating further workup today.  She has musculoskeletal back pain which is chronic.  We discussed that it likely will take weeks for this  to return to baseline, she was prescribed Toradol for use as an outpatient and referred to a back specialist for follow-up on her chronic pain which now she has been to the emergency department for numerous times.  We discussed conservative management for chronic low back pain at home as well, she was given written information about this as well as return precautions.    Procedure  Procedures       [1]   Past Medical History:  Diagnosis Date    Migraine, unspecified, not intractable, without status migrainosus     Migraines   [2] History reviewed. No pertinent surgical history.  [3] No family history on file.  [4]   Social History  Tobacco Use    Smoking status: Never    Smokeless tobacco: Never   Substance Use Topics    Alcohol use: Not on file    Drug use: Not on file        Brandon Boggs DO  07/26/25 0437

## 2025-07-26 NOTE — LETTER
July 26, 2025    Patient: Lakeshia Paez   YOB: 1990   Date of Visit: 7/26/2025       To Whom It May Concern:    Lakeshia Paez was seen and treated in our emergency department on 7/26/2025. She may return to work on 7/29/2025.    If you have any questions or concerns, please don't hesitate to call.

## 2025-07-26 NOTE — ED TRIAGE NOTES
Pt seen 2 days ago for lower back pain x 2 months, prescribed several meds. Pt states she is still in pain. Aox4

## 2025-07-26 NOTE — DISCHARGE INSTRUCTIONS
El tratamiento óptimo para el dolor de espalda musculoesquelético incluye scott combinación de medicamentos, incluyendo un parche de lidocaína para adormecer la giles. Estos deben aplicarse víctor 12 horas y retirarse 12 horas antes de aplicar el siguiente parche para evitar la irritación de la piel. Los relajantes musculares vasu la orfenadrina pueden causar debilidad y somnolencia. No conduzca, levante ni opere maquinaria pesada, ni se coloque en ninguna posición en la que la debilidad o la fatiga muscular puedan causarle daño a usted o a otras personas mientras esté tomando be medicamento. Puede afia ibuprofeno o Aleve dos veces al día; también puede afia Tylenol para un mayor alivio del dolor. Use hielo para adormecer el dolor y calor para relajar los músculos, además de descansar para reducir cualquier tensión adicional en la giles tanto vasu sea posible mientras ventura cuerpo se recupera. Se pueden recetar esteroides vasu la prednisona para el dolor del nervio ciático, además de estos otros medicamentos.     Be tipo de dolor de espalda puede tardar un par de semanas en mejorar. Si no mejora con el tiempo, debe realizar un seguimiento con el especialista en ortopedia y columna, el Dr. Pandey, para un mayor tratamiento fuera del hospital. Es posible que le recomienden fisioterapia o incluso lo deriven a inyecciones articulares para ayudar a tratar esto con el tiempo.    Regrese al departamento de emergencias por cualquier síntoma nuevo o que empeore, incluyendo incontinencia de orina o heces, debilidad severa en las piernas que causa incapacidad para caminar, entumecimiento entre las piernas cuando se limpia después de ir al baño, que podrían ser signos de compresión de la médula swift y requieren scott reevaluación inmediata por parte del médico en el departamento de emergencias para prevenir complicaciones de por evelyne.

## 2025-08-25 ENCOUNTER — HOSPITAL ENCOUNTER (EMERGENCY)
Facility: HOSPITAL | Age: 35
Discharge: HOME | End: 2025-08-25

## 2025-08-25 ASSESSMENT — PAIN SCALES - GENERAL: PAINLEVEL_OUTOF10: 8

## 2025-08-25 ASSESSMENT — PAIN DESCRIPTION - PAIN TYPE: TYPE: ACUTE PAIN

## 2025-08-25 ASSESSMENT — PAIN DESCRIPTION - LOCATION: LOCATION: MOUTH

## 2025-08-25 ASSESSMENT — PAIN - FUNCTIONAL ASSESSMENT: PAIN_FUNCTIONAL_ASSESSMENT: 0-10
